# Patient Record
Sex: FEMALE | URBAN - METROPOLITAN AREA
[De-identification: names, ages, dates, MRNs, and addresses within clinical notes are randomized per-mention and may not be internally consistent; named-entity substitution may affect disease eponyms.]

---

## 2019-06-06 ENCOUNTER — NURSE TRIAGE (OUTPATIENT)
Dept: NURSING | Facility: CLINIC | Age: 79
End: 2019-06-06

## 2019-06-13 ENCOUNTER — NURSE TRIAGE (OUTPATIENT)
Dept: NURSING | Facility: CLINIC | Age: 79
End: 2019-06-13

## 2019-06-13 NOTE — TELEPHONE ENCOUNTER
Danya GLYNN from [facility] calling regarding patient's INR today of 3.2.  Paged Dr. Mcguire and connected him to speak with home care nurse.    Maria Esther Davalos RN  [facility]    Reason for Disposition    [1] Caller requests to speak ONLY to PCP AND [2] URGENT question    Protocols used: PCP CALL - NO TRIAGE-A-

## 2020-05-28 ENCOUNTER — NURSE TRIAGE (OUTPATIENT)
Dept: NURSING | Facility: CLINIC | Age: 80
End: 2020-05-28

## 2020-05-28 NOTE — TELEPHONE ENCOUNTER
Visits with Jumana are later in the day. Homecare RN has not seen her since April.   March 12 was last INR which was 2.2.    Today INR is 3.8.  Currently on Warfarin 3mg Sun, Tues and Fri. 6mg the rest of the week.   20mg prednisone with Tylenol one day this week.   Eating frozen instead of fresh broccoli.   Primary provider is Dr. Carrie Maher.     RN paged Dr. Mcguire and connected home care RN and Dr. Mcguire to discuss patient's INR result.     Janell Sung, RENARD   [facility] RN Triage     Reason for Disposition    [1] Caller requests to speak ONLY to PCP AND [2] urgent question    Additional Information    Negative: [1] Follow-up call from patient regarding patient's clinical status AND [2] information urgent    Negative: [1] Prescription not at pharmacy AND [2] was prescribed today by PCP    Negative: Lab or radiology calling with CRITICAL test results    Negative: Call about patient who is currently hospitalized    Negative: Physician call to PCP    Negative: ED call to PCP    Negative: Lab calling with strep throat test results and triager can call in prescription    Negative: Lab calling with urinalysis test results and triager can call in prescription    Negative: Medication questions    Protocols used: [facility]

## 2020-09-21 NOTE — TELEPHONE ENCOUNTER
Madisyn nurse from [facility] calling with INR 3.5, pt range is 2-3, needs Coumadin orders . Paged on call Dr. Pittman to call nurse at [phone] at 7:52 pm.  Mirela Laboy RN [facility]    
Attending Shireen Mckenzie: Gen: NAD, heent: atrauamtic, eomi, perrla, mmm, op pink, uvula midline, neck; nttp, no nuchal rigidity, chest: nttp, no crepitus, cv: rrr, no murmurs, lungs: ctab, abd: soft, nontender, nondistended, no peritoneal signs, +BS, no guarding, ext: wwp, mild swelling to upper extreimties, ecchymoses to dorsum of right hand, skin: no rash, neuro: awake and alert, following commands, speech clear, sensation and strength intact, no focal deficits , stable gait